# Patient Record
Sex: MALE | Race: WHITE
[De-identification: names, ages, dates, MRNs, and addresses within clinical notes are randomized per-mention and may not be internally consistent; named-entity substitution may affect disease eponyms.]

---

## 2024-08-28 ENCOUNTER — APPOINTMENT (OUTPATIENT)
Dept: ORTHOPEDIC SURGERY | Facility: CLINIC | Age: 34
End: 2024-08-28

## 2024-08-28 VITALS — HEIGHT: 67 IN | BODY MASS INDEX: 29.82 KG/M2 | WEIGHT: 190 LBS

## 2024-08-28 DIAGNOSIS — M25.811 OTHER SPECIFIED JOINT DISORDERS, RIGHT SHOULDER: ICD-10-CM

## 2024-08-28 DIAGNOSIS — M25.512 PAIN IN RIGHT SHOULDER: ICD-10-CM

## 2024-08-28 DIAGNOSIS — M25.511 PAIN IN RIGHT SHOULDER: ICD-10-CM

## 2024-08-28 PROCEDURE — 73030 X-RAY EXAM OF SHOULDER: CPT | Mod: 50

## 2024-08-28 PROCEDURE — 99204 OFFICE O/P NEW MOD 45 MIN: CPT | Mod: 25

## 2024-08-28 RX ORDER — MELOXICAM 15 MG/1
15 TABLET ORAL
Qty: 30 | Refills: 2 | Status: ACTIVE | COMMUNITY
Start: 2024-08-28 | End: 1900-01-01

## 2024-08-28 RX ORDER — FLUOXETINE HYDROCHLORIDE 20 MG/1
20 CAPSULE ORAL
Refills: 0 | Status: ACTIVE | COMMUNITY

## 2024-08-28 NOTE — HISTORY OF PRESENT ILLNESS
[Dull/Aching] : dull/aching [Localized] : localized [8] : 8 [0] : 0 [Constant] : constant [Sleep] : sleep [Nothing helps with pain getting better] : Nothing helps with pain getting better [de-identified] : 08/28/2024- 35 yo RHD M presenting with b/l shoulder pain that started after weight lifting on 08/23/2024. Pain is worse on right. Felt pain the day after that has gotten progressively worse. Describes posterior shoulder pain that radiates to the lateral shoulder. Limited ROM. Denies history of prior injury. [] : no

## 2024-08-28 NOTE — PHYSICAL EXAM
[Bilateral] : shoulder bilaterally [5 ___] : forward flexion 5[unfilled]/5 [5___] : internal rotation 5[unfilled]/5 [] : no sensory deficits [FreeTextEntry8] : Pecs intact  [TWNoteComboBox7] : active forward flexion 150 degrees [TWNoteComboBox6] : internal rotation L4

## 2024-08-28 NOTE — ASSESSMENT
[FreeTextEntry1] : Underlying pathology reviewed and treatment options discussed.  08/28/2024: B/L x-rays, 4 views, reveal unremarkable findings. Obtain MRI of the RT SH to r/o tear. Prescribed Meloxicam 15 mg - I discussed the proper use of this medication and potential side effects. He will speak to his PCP regarding taking Mobic and Prozac.  Activity modification as tolerated. Questions addressed.  The documentation recorded by the scribe accurately reflects the service I personally performed and the decisions made by me. I, Ilya Mitchell, attest that this documentation has been prepared under the direction and in the presence of Provider Ildefonso Murry MD.  The patient was seen by Ildefonso Murry MD.

## 2024-08-30 ENCOUNTER — APPOINTMENT (OUTPATIENT)
Dept: MRI IMAGING | Facility: CLINIC | Age: 34
End: 2024-08-30

## 2024-09-18 ENCOUNTER — APPOINTMENT (OUTPATIENT)
Dept: ORTHOPEDIC SURGERY | Facility: CLINIC | Age: 34
End: 2024-09-18